# Patient Record
Sex: FEMALE | Race: WHITE | NOT HISPANIC OR LATINO | Employment: OTHER | URBAN - METROPOLITAN AREA
[De-identification: names, ages, dates, MRNs, and addresses within clinical notes are randomized per-mention and may not be internally consistent; named-entity substitution may affect disease eponyms.]

---

## 2017-07-05 ENCOUNTER — CONVERSION ENCOUNTER (OUTPATIENT)
Dept: OTHER | Facility: HOSPITAL | Age: 57
End: 2017-07-05

## 2018-07-06 ENCOUNTER — CONVERSION ENCOUNTER (OUTPATIENT)
Dept: OTHER | Facility: HOSPITAL | Age: 58
End: 2018-07-06

## 2019-06-04 VITALS
HEIGHT: 61 IN | BODY MASS INDEX: 25.3 KG/M2 | DIASTOLIC BLOOD PRESSURE: 69 MMHG | WEIGHT: 138 LBS | SYSTOLIC BLOOD PRESSURE: 113 MMHG | HEART RATE: 74 BPM | DIASTOLIC BLOOD PRESSURE: 76 MMHG | WEIGHT: 134 LBS | SYSTOLIC BLOOD PRESSURE: 116 MMHG | HEART RATE: 72 BPM

## 2019-07-08 ENCOUNTER — OFFICE VISIT (OUTPATIENT)
Dept: NEUROLOGY | Facility: CLINIC | Age: 59
End: 2019-07-08

## 2019-07-08 VITALS
WEIGHT: 144 LBS | HEIGHT: 61 IN | HEART RATE: 91 BPM | SYSTOLIC BLOOD PRESSURE: 123 MMHG | DIASTOLIC BLOOD PRESSURE: 74 MMHG | BODY MASS INDEX: 27.19 KG/M2

## 2019-07-08 DIAGNOSIS — G40.109 SEIZURE, TEMPORAL LOBE (HCC): Primary | ICD-10-CM

## 2019-07-08 DIAGNOSIS — G25.3 MYOCLONIC JERKING: ICD-10-CM

## 2019-07-08 DIAGNOSIS — R25.1 TREMOR: ICD-10-CM

## 2019-07-08 PROBLEM — G40.909 SEIZURE DISORDER (HCC): Status: ACTIVE | Noted: 2019-07-08

## 2019-07-08 PROBLEM — I35.0 AORTIC STENOSIS: Status: ACTIVE | Noted: 2018-05-18

## 2019-07-08 PROBLEM — F41.9 ANXIETY DISORDER: Status: ACTIVE | Noted: 2019-07-08

## 2019-07-08 PROBLEM — E78.5 HYPERLIPIDEMIA: Status: ACTIVE | Noted: 2019-07-08

## 2019-07-08 PROCEDURE — 99214 OFFICE O/P EST MOD 30 MIN: CPT | Performed by: PSYCHIATRY & NEUROLOGY

## 2019-07-08 RX ORDER — GABAPENTIN 800 MG/1
800 TABLET ORAL 4 TIMES DAILY
Qty: 360 TABLET | Refills: 3 | Status: SHIPPED | OUTPATIENT
Start: 2019-07-08 | End: 2020-07-08 | Stop reason: SDUPTHER

## 2019-07-08 RX ORDER — PRAVASTATIN SODIUM 40 MG
40 TABLET ORAL DAILY
Refills: 3 | COMMUNITY
Start: 2019-04-21

## 2019-07-08 RX ORDER — GABAPENTIN 800 MG/1
800 TABLET ORAL 4 TIMES DAILY
Refills: 3 | COMMUNITY
Start: 2019-05-23 | End: 2019-07-08 | Stop reason: SDUPTHER

## 2019-07-08 RX ORDER — BUSPIRONE HYDROCHLORIDE 30 MG/1
15 TABLET ORAL 2 TIMES DAILY
Refills: 2 | COMMUNITY
Start: 2019-06-01 | End: 2021-07-08 | Stop reason: SDUPTHER

## 2019-07-08 RX ORDER — THIORIDAZINE HYDROCHLORIDE 10 MG/1
20 TABLET, FILM COATED ORAL
Refills: 3 | COMMUNITY
Start: 2019-05-28

## 2019-07-08 NOTE — PROGRESS NOTES
Subjective:     Patient ID: Sean Kovacs is a 58 y.o. female.    History of Present Illness Seizure Disorder, Essential Tremor    Yearly f/u for seizure disorder, patient doing well with current medication. Patient has had a few little jerking with lips and other part of the body,  but nothing major.  Jerks of the arm can be a warning symptome of seizure, if goes and lays down it seems to stop and does not have a seizure      Essential Tremors currently taking gabapentin. Patient states usually her arm starts jerking and has to lay down and will stop.     The following portions of the patient's history were reviewed and updated as appropriate: allergies, current medications, past family history, past medical history, past social history, past surgical history and problem list.      Current Outpatient Medications:   •  busPIRone (BUSPAR) 30 MG tablet, Take 15 mg by mouth 2 (Two) Times a Day., Disp: , Rfl: 2  •  gabapentin (NEURONTIN) 800 MG tablet, Take 800 mg by mouth 4 (Four) Times a Day., Disp: , Rfl: 3  •  pravastatin (PRAVACHOL) 40 MG tablet, Take 40 mg by mouth Daily., Disp: , Rfl: 3  •  thioridazine (MELLARIL) 10 MG tablet, Take 20 mg by mouth every night at bedtime., Disp: , Rfl: 3    Review of Systems   Constitutional: Negative for appetite change and fatigue.   HENT: Negative for sinus pressure and sinus pain.    Eyes: Negative for pain and redness.   Respiratory: Negative for shortness of breath and wheezing.    Cardiovascular: Negative for chest pain and palpitations.   Gastrointestinal: Negative for constipation and diarrhea.   Endocrine: Negative for cold intolerance and heat intolerance.   Genitourinary: Negative for difficulty urinating and frequency.   Musculoskeletal: Negative for back pain and neck pain.   Allergic/Immunologic: Negative for environmental allergies.   Neurological: Positive for tremors. Negative for dizziness, seizures, syncope, facial asymmetry, speech difficulty, weakness,  light-headedness, numbness and headaches.   Psychiatric/Behavioral: Negative for confusion.      Reviewed, pmh allergies and social history    I have reviewed ROS completed by medical assistant.     Objective:    Neurologic Exam     Mental Status   Oriented to person, place, and time.   Level of consciousness: alert    Gait, Coordination, and Reflexes     Tremor   Action tremor: right arm (minimal)      Physical Exam   Constitutional: She is oriented to person, place, and time.   Neurological: She is oriented to person, place, and time.       Assessment/Plan:  1 seizures, no recent seizures  2. Tremors minimal  3.  Myoclonic jerks, may be associated with the epilepsy, will obtain EEG, last time jerked several months ago    continue current medications    Erwi was seen today for seizures and tremors.    Diagnoses and all orders for this visit:    Seizure, temporal lobe (CMS/[facility])    Tremor            This document has been electronically signed by Joseph Seipel, MD on July 8, 2019 12:06 PM

## 2019-07-15 ENCOUNTER — HOSPITAL ENCOUNTER (OUTPATIENT)
Dept: NEUROLOGY | Facility: HOSPITAL | Age: 59
Discharge: HOME | End: 2019-07-15
Admitting: PSYCHIATRY & NEUROLOGY

## 2019-07-15 DIAGNOSIS — G25.3 MYOCLONIC JERKING: ICD-10-CM

## 2019-07-15 DIAGNOSIS — G40.109 SEIZURE, TEMPORAL LOBE (HCC): ICD-10-CM

## 2019-07-15 PROCEDURE — 95816 EEG AWAKE AND DROWSY: CPT

## 2019-07-15 PROCEDURE — 95816 EEG AWAKE AND DROWSY: CPT | Performed by: PSYCHIATRY & NEUROLOGY

## 2020-07-08 ENCOUNTER — OFFICE VISIT (OUTPATIENT)
Dept: NEUROLOGY | Facility: CLINIC | Age: 60
End: 2020-07-08

## 2020-07-08 VITALS
DIASTOLIC BLOOD PRESSURE: 85 MMHG | TEMPERATURE: 97.8 F | SYSTOLIC BLOOD PRESSURE: 130 MMHG | HEART RATE: 72 BPM | HEIGHT: 62 IN | WEIGHT: 154.4 LBS | BODY MASS INDEX: 28.41 KG/M2

## 2020-07-08 DIAGNOSIS — G25.0 ESSENTIAL TREMOR: ICD-10-CM

## 2020-07-08 DIAGNOSIS — G40.909 SEIZURE DISORDER (HCC): Primary | ICD-10-CM

## 2020-07-08 PROCEDURE — 99213 OFFICE O/P EST LOW 20 MIN: CPT | Performed by: PSYCHIATRY & NEUROLOGY

## 2020-07-08 RX ORDER — ALENDRONATE SODIUM 70 MG/1
70 TABLET ORAL WEEKLY
COMMUNITY
Start: 2020-04-13

## 2020-07-08 RX ORDER — GABAPENTIN 800 MG/1
800 TABLET ORAL 4 TIMES DAILY
Qty: 360 TABLET | Refills: 3 | Status: SHIPPED | OUTPATIENT
Start: 2020-07-08 | End: 2021-07-08 | Stop reason: SDUPTHER

## 2020-07-08 NOTE — PROGRESS NOTES
Subjective: Seizure, Temporal Lobe, Tremors    Patient ID: Sean Kovacs is a 59 y.o. female.    History of Present Illness, yearly f/u     Pt here with her sister; Dory Long  Seizure disorder, myoclonic jerks f/u with EEG no recent seizures   EEG 7/15/2019 showed This is an abnormal EEG due to the presence of generalized 4 Hz spike and   wave discharges.  This type abnormality is associated with a seizure   disorder of generalized type.    Tremors, minimal currently taking gabapentin. Unchanged     The following portions of the patient's history were reviewed and updated as appropriate: allergies, current medications, past family history, past medical history, past social history, past surgical history and problem list.    Family History   Problem Relation Age of Onset   • Thyroid disease Mother    • Heart disease Father        Past Medical History:   Diagnosis Date   • Hyperlipidemia    • Seizures (CMS/HCC)    • Tremor, essential        Social History     Socioeconomic History   • Marital status: Single     Spouse name: Not on file   • Number of children: Not on file   • Years of education: Not on file   • Highest education level: Not on file   Tobacco Use   • Smoking status: Never Smoker   • Smokeless tobacco: Never Used   Substance and Sexual Activity   • Alcohol use: No     Frequency: Never   • Drug use: No   • Sexual activity: Defer         Current Outpatient Medications:   •  alendronate (FOSAMAX) 70 MG tablet, Take 70 mg by mouth 1 (One) Time Per Week., Disp: , Rfl:   •  Aspirin Buf,CaCarb-MgCarb-MgO, 81 MG tablet, Take 81 mg by mouth Daily., Disp: , Rfl:   •  busPIRone (BUSPAR) 30 MG tablet, Take 15 mg by mouth 2 (Two) Times a Day., Disp: , Rfl: 2  •  gabapentin (NEURONTIN) 800 MG tablet, Take 1 tablet by mouth 4 (Four) Times a Day., Disp: 360 tablet, Rfl: 3  •  pravastatin (PRAVACHOL) 40 MG tablet, Take 40 mg by mouth Daily., Disp: , Rfl: 3  •  thioridazine (MELLARIL) 10 MG tablet, Take 20 mg  by mouth every night at bedtime., Disp: , Rfl: 3    Review of Systems   Constitutional: Negative for appetite change and fatigue.   HENT: Negative for sinus pressure and sinus pain.    Eyes: Negative for pain and redness.   Respiratory: Negative for shortness of breath and wheezing.    Cardiovascular: Negative for chest pain and palpitations.   Gastrointestinal: Positive for constipation. Negative for diarrhea.   Endocrine: Negative for cold intolerance and heat intolerance.   Genitourinary: Negative for difficulty urinating and frequency.   Musculoskeletal: Negative for back pain and neck pain.   Allergic/Immunologic: Negative for environmental allergies.   Neurological: Positive for tremors. Negative for dizziness, seizures, syncope, facial asymmetry, speech difficulty, weakness, light-headedness, numbness and headaches.   Psychiatric/Behavioral: Negative for confusion.          I have reviewed ROS completed by medical assistant.     Objective:    Neurologic Exam     Mental Status   Oriented to person, place, and time.   Level of consciousness: alert    Gait, Coordination, and Reflexes     Gait  Gait: normal      Physical Exam   Constitutional: She is oriented to person, place, and time. She appears well-developed.   Neurological: She is alert and oriented to person, place, and time. Gait normal.   Psychiatric: She has a normal mood and affect. Her behavior is normal.   Vitals reviewed.      Assessment/Plan:    Sean was seen today for seizures and tremors.    Diagnoses and all orders for this visit:    Seizure disorder (CMS/HCC)    Essential tremor        Pt is doing well , continue the gabapentin for seizures and the tremor.    This document has been electronically signed by Joseph Seipel, MD on July 8, 2020 14:23

## 2021-07-08 ENCOUNTER — OFFICE VISIT (OUTPATIENT)
Dept: NEUROLOGY | Facility: CLINIC | Age: 61
End: 2021-07-08

## 2021-07-08 VITALS
HEIGHT: 62 IN | WEIGHT: 133.6 LBS | HEART RATE: 76 BPM | DIASTOLIC BLOOD PRESSURE: 70 MMHG | TEMPERATURE: 98.4 F | SYSTOLIC BLOOD PRESSURE: 107 MMHG | BODY MASS INDEX: 24.59 KG/M2

## 2021-07-08 DIAGNOSIS — F41.9 ANXIETY DISORDER, UNSPECIFIED TYPE: ICD-10-CM

## 2021-07-08 DIAGNOSIS — G40.909 SEIZURE DISORDER (HCC): Primary | ICD-10-CM

## 2021-07-08 DIAGNOSIS — G25.0 ESSENTIAL TREMOR: ICD-10-CM

## 2021-07-08 PROCEDURE — 99213 OFFICE O/P EST LOW 20 MIN: CPT | Performed by: PSYCHIATRY & NEUROLOGY

## 2021-07-08 RX ORDER — GABAPENTIN 800 MG/1
800 TABLET ORAL 4 TIMES DAILY
Qty: 360 TABLET | Refills: 3 | Status: SHIPPED | OUTPATIENT
Start: 2021-07-08 | End: 2022-09-06 | Stop reason: SDUPTHER

## 2021-07-08 RX ORDER — BUSPIRONE HYDROCHLORIDE 15 MG/1
15 TABLET ORAL 2 TIMES DAILY
COMMUNITY
Start: 2021-05-17

## 2022-07-12 ENCOUNTER — TELEPHONE (OUTPATIENT)
Dept: NEUROLOGY | Facility: CLINIC | Age: 62
End: 2022-07-12

## 2022-07-12 DIAGNOSIS — G40.909 SEIZURE DISORDER: Primary | ICD-10-CM

## 2022-07-12 NOTE — TELEPHONE ENCOUNTER
Patient presented today for her appt. Her sister and 2 small children were with her. I explained to the sister due to construction and covid restrictions we could not see Erwi due to not being able to have the kids back in the exam room. Sister was upset and wanted patient seen within next month and I told her that wouldn't be possible. She'd like a referral to a new neurologist due to us not being able to see her.

## 2022-09-06 RX ORDER — GABAPENTIN 800 MG/1
800 TABLET ORAL 4 TIMES DAILY
Qty: 360 TABLET | Refills: 3 | Status: SHIPPED | OUTPATIENT
Start: 2022-09-06

## 2022-09-06 NOTE — TELEPHONE ENCOUNTER
Caller: BRYCE LOBO    Relationship: Emergency Contact     Best call back number:    Requested Prescriptions:   Requested Prescriptions     Pending Prescriptions Disp Refills   • gabapentin (NEURONTIN) 800 MG tablet 360 tablet 3     Sig: Take 1 tablet by mouth 4 (Four) Times a Day.        Pharmacy where request should be sent:[id number], [phone]      Additional details provided by patient: PATIENT WILL BE COMPLETELY OUT OF MEDS BY Friday 9/9/22 & WOULD LIKE RX REFILLED UNTIL F/U APPT SCHED. WITH DR. SEIPEL 2/14/23 @ 10:15AM    PLEASE CALL & ADVISE    Does the patient have less than a 3 day supply:  [x] Yes  [] No    Thaddeus Ziegler Rep   09/06/22 10:50 EDT